# Patient Record
Sex: MALE | Race: WHITE | HISPANIC OR LATINO
[De-identification: names, ages, dates, MRNs, and addresses within clinical notes are randomized per-mention and may not be internally consistent; named-entity substitution may affect disease eponyms.]

---

## 2021-04-19 PROBLEM — Z00.00 ENCOUNTER FOR PREVENTIVE HEALTH EXAMINATION: Status: ACTIVE | Noted: 2021-04-19

## 2021-04-20 ENCOUNTER — APPOINTMENT (OUTPATIENT)
Dept: UROLOGY | Facility: CLINIC | Age: 36
End: 2021-04-20
Payer: MEDICARE

## 2021-04-20 VITALS
TEMPERATURE: 98.4 F | BODY MASS INDEX: 25.71 KG/M2 | SYSTOLIC BLOOD PRESSURE: 122 MMHG | DIASTOLIC BLOOD PRESSURE: 82 MMHG | HEIGHT: 64.96 IN | WEIGHT: 154.32 LBS | HEART RATE: 82 BPM

## 2021-04-20 DIAGNOSIS — E29.1 TESTICULAR HYPOFUNCTION: ICD-10-CM

## 2021-04-20 PROCEDURE — 99205 OFFICE O/P NEW HI 60 MIN: CPT | Mod: 57

## 2021-04-20 RX ORDER — FERROUS SULFATE 325(65) MG
325 TABLET ORAL
Refills: 0 | Status: ACTIVE | COMMUNITY

## 2021-04-20 RX ORDER — STOOL SOFTENER 240 MG/1
CAPSULE, LIQUID FILLED ORAL
Refills: 0 | Status: ACTIVE | COMMUNITY

## 2021-04-20 RX ORDER — AMLODIPINE BESYLATE 5 MG/1
TABLET ORAL
Refills: 0 | Status: ACTIVE | COMMUNITY

## 2021-04-20 RX ORDER — SITAGLIPTIN 100 MG/1
TABLET, FILM COATED ORAL
Refills: 0 | Status: ACTIVE | COMMUNITY

## 2021-04-20 RX ORDER — ATORVASTATIN CALCIUM 80 MG/1
TABLET, FILM COATED ORAL
Refills: 0 | Status: ACTIVE | COMMUNITY

## 2021-04-20 RX ORDER — SEVELAMER HYDROCHLORIDE 800 MG/1
TABLET, FILM COATED ORAL
Refills: 0 | Status: ACTIVE | COMMUNITY

## 2021-04-20 NOTE — ASSESSMENT
[FreeTextEntry1] : hypogonadism\par will repeat postejaculatory urinalysis - instructions given\par will cryopreserve if motile\par hormonal eval today\par We spoke at length about the role of office based TESE with possible percutaneous epididymal sperm aspiration. Risks- bleeding, pain, infection, hypogonadism all discussed.  Risk of secondary epididymal obstruction also discussed. Procedure will be done under local anesthesia. He understands that sperm yields tend to be lower with this and that he may require future procedure for repeat sperm extraction if IVF attempts fail.\par would like to proceed\par will joselin

## 2021-04-20 NOTE — HISTORY OF PRESENT ILLNESS
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 no male factor pregnancies. wife with previous pregnancy. using viagra on occasion\par SA azoospermia - PEUA - 16 mil/ml no motility noted\par

## 2021-04-21 LAB
ANION GAP SERPL CALC-SCNC: 16 MMOL/L
APTT BLD: 35.9 SEC
BUN SERPL-MCNC: 30 MG/DL
CALCIUM SERPL-MCNC: 9.3 MG/DL
CHLORIDE SERPL-SCNC: 97 MMOL/L
CO2 SERPL-SCNC: 24 MMOL/L
CREAT SERPL-MCNC: 6.68 MG/DL
ESTIMATED AVERAGE GLUCOSE: 154 MG/DL
ESTRADIOL SERPL-MCNC: 35 PG/ML
FSH SERPL-MCNC: 2.2 IU/L
GLUCOSE SERPL-MCNC: 313 MG/DL
HBA1C MFR BLD HPLC: 7 %
INR PPP: 1 RATIO
LH SERPL-ACNC: 7.5 IU/L
POTASSIUM SERPL-SCNC: 3.9 MMOL/L
PT BLD: 11.8 SEC
SODIUM SERPL-SCNC: 137 MMOL/L

## 2021-04-26 LAB
TESTOST BND SERPL-MCNC: 9.4 PG/ML
TESTOST SERPL-MCNC: 344.2 NG/DL

## 2021-05-05 ENCOUNTER — APPOINTMENT (OUTPATIENT)
Dept: UROLOGY | Facility: CLINIC | Age: 36
End: 2021-05-05